# Patient Record
Sex: FEMALE | Race: WHITE | NOT HISPANIC OR LATINO
[De-identification: names, ages, dates, MRNs, and addresses within clinical notes are randomized per-mention and may not be internally consistent; named-entity substitution may affect disease eponyms.]

---

## 2017-10-26 ENCOUNTER — APPOINTMENT (OUTPATIENT)
Dept: GASTROENTEROLOGY | Facility: CLINIC | Age: 42
End: 2017-10-26
Payer: COMMERCIAL

## 2017-10-26 VITALS
BODY MASS INDEX: 23.32 KG/M2 | HEART RATE: 65 BPM | OXYGEN SATURATION: 98 % | DIASTOLIC BLOOD PRESSURE: 67 MMHG | WEIGHT: 140 LBS | HEIGHT: 65 IN | SYSTOLIC BLOOD PRESSURE: 97 MMHG | TEMPERATURE: 98.8 F | RESPIRATION RATE: 14 BRPM

## 2017-10-26 DIAGNOSIS — Z78.9 OTHER SPECIFIED HEALTH STATUS: ICD-10-CM

## 2017-10-26 DIAGNOSIS — Z82.62 FAMILY HISTORY OF OSTEOPOROSIS: ICD-10-CM

## 2017-10-26 DIAGNOSIS — Z00.00 ENCOUNTER FOR GENERAL ADULT MEDICAL EXAMINATION W/OUT ABNORMAL FINDINGS: ICD-10-CM

## 2017-10-26 PROCEDURE — 99204 OFFICE O/P NEW MOD 45 MIN: CPT

## 2018-01-09 ENCOUNTER — APPOINTMENT (OUTPATIENT)
Dept: GASTROENTEROLOGY | Facility: CLINIC | Age: 43
End: 2018-01-09

## 2018-07-12 ENCOUNTER — OTHER (OUTPATIENT)
Age: 43
End: 2018-07-12

## 2018-07-25 ENCOUNTER — APPOINTMENT (OUTPATIENT)
Dept: GASTROENTEROLOGY | Facility: HOSPITAL | Age: 43
End: 2018-07-25

## 2018-07-25 ENCOUNTER — OUTPATIENT (OUTPATIENT)
Dept: OUTPATIENT SERVICES | Facility: HOSPITAL | Age: 43
LOS: 1 days | End: 2018-07-25
Payer: COMMERCIAL

## 2018-07-25 ENCOUNTER — RESULT REVIEW (OUTPATIENT)
Age: 43
End: 2018-07-25

## 2018-07-25 DIAGNOSIS — K20.0 EOSINOPHILIC ESOPHAGITIS: ICD-10-CM

## 2018-07-25 PROCEDURE — 43239 EGD BIOPSY SINGLE/MULTIPLE: CPT

## 2018-07-25 PROCEDURE — 88305 TISSUE EXAM BY PATHOLOGIST: CPT

## 2018-07-25 PROCEDURE — 88312 SPECIAL STAINS GROUP 1: CPT | Mod: 26

## 2018-07-25 PROCEDURE — 88312 SPECIAL STAINS GROUP 1: CPT

## 2018-07-25 PROCEDURE — 88305 TISSUE EXAM BY PATHOLOGIST: CPT | Mod: 26

## 2018-08-02 ENCOUNTER — APPOINTMENT (OUTPATIENT)
Dept: GASTROENTEROLOGY | Facility: CLINIC | Age: 43
End: 2018-08-02

## 2018-08-07 ENCOUNTER — APPOINTMENT (OUTPATIENT)
Dept: GASTROENTEROLOGY | Facility: CLINIC | Age: 43
End: 2018-08-07
Payer: COMMERCIAL

## 2018-08-07 VITALS
WEIGHT: 144 LBS | HEIGHT: 65 IN | BODY MASS INDEX: 23.99 KG/M2 | OXYGEN SATURATION: 98 % | HEART RATE: 79 BPM | TEMPERATURE: 97 F | SYSTOLIC BLOOD PRESSURE: 100 MMHG | DIASTOLIC BLOOD PRESSURE: 76 MMHG

## 2018-08-07 DIAGNOSIS — Z87.19 PERSONAL HISTORY OF OTHER DISEASES OF THE DIGESTIVE SYSTEM: ICD-10-CM

## 2018-08-07 PROCEDURE — 99214 OFFICE O/P EST MOD 30 MIN: CPT

## 2018-08-07 RX ORDER — ESOMEPRAZOLE MAGNESIUM 40 MG/1
40 CAPSULE, DELAYED RELEASE ORAL TWICE DAILY
Qty: 60 | Refills: 5 | Status: ACTIVE | COMMUNITY
Start: 2018-08-07 | End: 1900-01-01

## 2018-10-24 ENCOUNTER — RESULT REVIEW (OUTPATIENT)
Age: 43
End: 2018-10-24

## 2018-10-24 ENCOUNTER — OUTPATIENT (OUTPATIENT)
Dept: OUTPATIENT SERVICES | Facility: HOSPITAL | Age: 43
LOS: 1 days | End: 2018-10-24
Payer: COMMERCIAL

## 2018-10-24 ENCOUNTER — APPOINTMENT (OUTPATIENT)
Age: 43
End: 2018-10-24

## 2018-10-24 DIAGNOSIS — K20.0 EOSINOPHILIC ESOPHAGITIS: ICD-10-CM

## 2018-10-24 PROCEDURE — 43239 EGD BIOPSY SINGLE/MULTIPLE: CPT

## 2018-10-24 PROCEDURE — 88305 TISSUE EXAM BY PATHOLOGIST: CPT

## 2018-10-24 PROCEDURE — 88305 TISSUE EXAM BY PATHOLOGIST: CPT | Mod: 26

## 2018-10-31 ENCOUNTER — OTHER (OUTPATIENT)
Age: 43
End: 2018-10-31

## 2019-05-07 ENCOUNTER — TRANSCRIPTION ENCOUNTER (OUTPATIENT)
Age: 44
End: 2019-05-07

## 2019-11-13 ENCOUNTER — OTHER (OUTPATIENT)
Age: 44
End: 2019-11-13

## 2020-01-24 ENCOUNTER — APPOINTMENT (OUTPATIENT)
Dept: GASTROENTEROLOGY | Facility: CLINIC | Age: 45
End: 2020-01-24
Payer: COMMERCIAL

## 2020-01-24 VITALS
RESPIRATION RATE: 17 BRPM | WEIGHT: 136 LBS | DIASTOLIC BLOOD PRESSURE: 69 MMHG | SYSTOLIC BLOOD PRESSURE: 100 MMHG | OXYGEN SATURATION: 96 % | HEART RATE: 70 BPM | BODY MASS INDEX: 22.66 KG/M2 | TEMPERATURE: 97.8 F | HEIGHT: 65 IN

## 2020-01-24 DIAGNOSIS — Z87.19 PERSONAL HISTORY OF OTHER DISEASES OF THE DIGESTIVE SYSTEM: ICD-10-CM

## 2020-01-24 DIAGNOSIS — K20.0 EOSINOPHILIC ESOPHAGITIS: ICD-10-CM

## 2020-01-24 DIAGNOSIS — D50.9 IRON DEFICIENCY ANEMIA, UNSPECIFIED: ICD-10-CM

## 2020-01-24 PROCEDURE — 99214 OFFICE O/P EST MOD 30 MIN: CPT

## 2020-01-24 NOTE — PHYSICAL EXAM
[General Appearance - Alert] : alert [General Appearance - Well Nourished] : well nourished [General Appearance - In No Acute Distress] : in no acute distress [Sclera] : the sclera and conjunctiva were normal [Strabismus] : no strabismus was seen [Extraocular Movements] : extraocular movements were intact [Examination Of The Oral Cavity] : the lips and gums were normal [Outer Ear] : the ears and nose were normal in appearance [Oropharynx] : the oropharynx was normal [Neck Appearance] : the appearance of the neck was normal [Thyroid Diffuse Enlargement] : the thyroid was not enlarged [Neck Cervical Mass (___cm)] : no neck mass was observed [Respiration, Rhythm And Depth] : normal respiratory rhythm and effort [Exaggerated Use Of Accessory Muscles For Inspiration] : no accessory muscle use [Auscultation Breath Sounds / Voice Sounds] : lungs were clear to auscultation bilaterally [Heart Rate And Rhythm] : heart rate was normal and rhythm regular [Heart Sounds] : normal S1 and S2 [Murmurs] : no murmurs [Bowel Sounds] : normal bowel sounds [Edema] : there was no peripheral edema [Abdomen Tenderness] : non-tender [Abdomen Soft] : soft [Abdomen Mass (___ Cm)] : no abdominal mass palpated [Abnormal Walk] : normal gait [Skin Color & Pigmentation] : normal skin color and pigmentation [Involuntary Movements] : no involuntary movements were seen [Skin Lesions] : no skin lesions [] : no rash [FreeTextEntry1] : aox3 [No Focal Deficits] : no focal deficits [Impaired Insight] : insight and judgment were intact [Affect] : the affect was normal

## 2020-01-24 NOTE — HISTORY OF PRESENT ILLNESS
[FreeTextEntry1] : Last visit 2018:\par Eosinophilic esophagitis (530.13) (K20.0)\par Dysphagia, unspecified type (787.20) (R13.10)\par \par 40 yo  female recently confirmed to have EoE presents for follow up. She ended up having significant endoscopic and histologic of EoE on recent endoscopy. We reviewed diagnosis of EoE today. In addition we discussed treatment options including:\par - High dose PPI therapy\par - Topical Steroid Therapy\par - Elimination Diet Therapies\par \par She wishes to pursue PPI therapy at this time, which given her previous response to it is appropriate.\par \par Impression:\par 1) EoE\par 2) Possible overlap with GERD\par 3) OAS\par \par Plan:\par 1) Nexium 40 mg BID x 8 weeks\par 2) EGD with bx and Endoflip in 8-12 weeks\par 3) All discussed at length. Plan agreed upon. All questions answered\par 4) F/u 2 weeks after EGD. \par ----------------------------------------------------------------------\par \par INGRID:\par Found to be anemia in 2019\par Still getting IV Iron infusions with hematology\par Most recent CBC with hgb 12.2 and mcv 85.  Does not have iron labs\par No overt GIB - no melena, brbpr, weight loss, n/v\par Had colon 11+ years ago, none since\par Average risk for CRC\par No celiac testing done\par \par EoE:\par On PPI daily - self decreased to daily from BID\par No dysphagia\par No heartburn\par No odynophagia\par No chest pain\par \par -------------------------------------------------\par Previous Workup:\par \par EGD 10/24/2018 - On BID PPI Therapy\par EREFS = ? 1 \par Subtle hint of furrows, but otherwise no stigmata of EoE\par 1 cm HH, Hill Grade 1\par Pathology:\par Final Diagnosis\par 1. Distal esophagus, endoscopic biopsy:\par - Active esophagitis with intraepithelial eosinophil\par infiltration (3 per high power\par field), see comment\par - No columnar epithelium present\par \par 2. Mid-proximal esophagus, endoscopic biopsy:\par - Active esophagitis with intraepithelial eosinophil\par infiltration (9 per high power\par field), see comment\par \par Comment:\par The findings are consistent with eosinophilic esophagitis.\par \par \par EGD 7/2018 - OFF therapy\par EREFS 2\par + Furrows\par + Exudate\par 3 cm HH, Hill Grade 1\par \par \par Pathology:\par Esophagus Distal - 122 eos/hpf\par Esophagus mid/prox - 14 eos/hpf\par Stomach - normal

## 2020-01-24 NOTE — ASSESSMENT
[FreeTextEntry1] : 45 yo  female presents for follow up of EoE controlled on BID PPI therapy.  She has self adjusted dose to daily with good symptom control. Patient newly diagnosed with INGRID thought to be 2/2 heavy menstruation and no overt GI causes.  However given new guidelines for CRC screening, would pursue Colonoscopy at this time which she is amenable to.  We will assess EoE burden given lower dose of PPI and prior egd with some residual eos despite high dose BID. She wishes to consider transfer to dietary therapy down the road as well.  Otherwise she is doing quite well at this time\par \par Impression:\par 1) EoE+/- GERD overlap\par 2) OAS\par 3) INGRID on IV infusion\par \par Plan:\par 1) EGD with Eso and Duodenal bx and Colonoscopy with Miralax prep.  If negative, there may be role for VCE to ensure no GI sources of recurrent INGRID\par 2) C/w PPI daily\par 3) Will send records over to hematology per request and get most recent labs\par 4) All discussed at length. Plan agreed upon. All questions answered\par 5) F/u 2 weeks after procedures

## 2020-03-18 ENCOUNTER — APPOINTMENT (OUTPATIENT)
Dept: GASTROENTEROLOGY | Facility: HOSPITAL | Age: 45
End: 2020-03-18

## 2021-12-24 ENCOUNTER — TRANSCRIPTION ENCOUNTER (OUTPATIENT)
Age: 46
End: 2021-12-24

## 2022-01-07 RX ORDER — OMEPRAZOLE 40 MG/1
40 CAPSULE, DELAYED RELEASE ORAL
Qty: 180 | Refills: 0 | Status: ACTIVE | COMMUNITY
Start: 2018-08-13 | End: 1900-01-01

## 2024-06-20 ENCOUNTER — OFFICE (OUTPATIENT)
Dept: URBAN - METROPOLITAN AREA CLINIC 109 | Facility: CLINIC | Age: 49
Setting detail: OPHTHALMOLOGY
End: 2024-06-20
Payer: COMMERCIAL

## 2024-06-20 DIAGNOSIS — H16.223: ICD-10-CM

## 2024-06-20 DIAGNOSIS — H02.831: ICD-10-CM

## 2024-06-20 DIAGNOSIS — H53.40: ICD-10-CM

## 2024-06-20 DIAGNOSIS — H02.834: ICD-10-CM

## 2024-06-20 PROCEDURE — SCCOS COSMETIC CONSULTATION: Performed by: OPHTHALMOLOGY

## 2024-06-20 PROCEDURE — 99204 OFFICE O/P NEW MOD 45 MIN: CPT | Performed by: OPHTHALMOLOGY

## 2024-06-20 PROCEDURE — 92082 INTERMEDIATE VISUAL FIELD XM: CPT | Performed by: OPHTHALMOLOGY

## 2024-06-20 PROCEDURE — 92285 EXTERNAL OCULAR PHOTOGRAPHY: CPT | Performed by: OPHTHALMOLOGY

## 2024-06-20 ASSESSMENT — LID POSITION - DERMATOCHALASIS
OD_DERMATOCHALASIS: RUL 1+
OS_DERMATOCHALASIS: LUL 1+

## 2024-06-20 ASSESSMENT — CONFRONTATIONAL VISUAL FIELD TEST (CVF)
OD_FINDINGS: FULL
OS_FINDINGS: FULL

## 2025-06-16 ENCOUNTER — APPOINTMENT (OUTPATIENT)
Dept: ORTHOPEDIC SURGERY | Facility: CLINIC | Age: 50
End: 2025-06-16
Payer: COMMERCIAL

## 2025-06-16 VITALS — BODY MASS INDEX: 23.16 KG/M2 | HEIGHT: 65 IN | WEIGHT: 139 LBS

## 2025-06-16 PROBLEM — S83.241A TEAR OF MEDIAL MENISCUS OF RIGHT KNEE: Status: ACTIVE | Noted: 2025-06-16

## 2025-06-16 PROCEDURE — 99204 OFFICE O/P NEW MOD 45 MIN: CPT
